# Patient Record
Sex: FEMALE | Race: BLACK OR AFRICAN AMERICAN | NOT HISPANIC OR LATINO | URBAN - METROPOLITAN AREA
[De-identification: names, ages, dates, MRNs, and addresses within clinical notes are randomized per-mention and may not be internally consistent; named-entity substitution may affect disease eponyms.]

---

## 2018-09-25 ENCOUNTER — EMERGENCY (EMERGENCY)
Facility: HOSPITAL | Age: 53
LOS: 1 days | Discharge: HOME | End: 2018-09-25
Attending: EMERGENCY MEDICINE
Payer: MEDICAID

## 2018-09-25 VITALS
TEMPERATURE: 98 F | OXYGEN SATURATION: 99 % | HEART RATE: 71 BPM | DIASTOLIC BLOOD PRESSURE: 94 MMHG | RESPIRATION RATE: 16 BRPM | SYSTOLIC BLOOD PRESSURE: 136 MMHG

## 2018-09-25 VITALS
DIASTOLIC BLOOD PRESSURE: 84 MMHG | SYSTOLIC BLOOD PRESSURE: 147 MMHG | HEART RATE: 79 BPM | OXYGEN SATURATION: 98 % | TEMPERATURE: 98 F | RESPIRATION RATE: 17 BRPM

## 2018-09-25 PROCEDURE — 99284 EMERGENCY DEPT VISIT MOD MDM: CPT

## 2018-09-25 PROCEDURE — 72170 X-RAY EXAM OF PELVIS: CPT | Mod: 26

## 2018-09-25 PROCEDURE — 99284 EMERGENCY DEPT VISIT MOD MDM: CPT | Mod: 25

## 2018-09-25 PROCEDURE — 72170 X-RAY EXAM OF PELVIS: CPT

## 2018-09-25 PROCEDURE — 96372 THER/PROPH/DIAG INJ SC/IM: CPT

## 2018-09-25 RX ORDER — DIAZEPAM 5 MG
1 TABLET ORAL
Qty: 6 | Refills: 0 | OUTPATIENT
Start: 2018-09-25 | End: 2018-09-27

## 2018-09-25 RX ORDER — IBUPROFEN 200 MG
1 TABLET ORAL
Qty: 9 | Refills: 0 | OUTPATIENT
Start: 2018-09-25 | End: 2018-09-27

## 2018-09-25 RX ORDER — DIAZEPAM 5 MG
10 TABLET ORAL ONCE
Qty: 0 | Refills: 0 | Status: DISCONTINUED | OUTPATIENT
Start: 2018-09-25 | End: 2018-09-25

## 2018-09-25 RX ORDER — MORPHINE SULFATE 50 MG/1
4 CAPSULE, EXTENDED RELEASE ORAL ONCE
Qty: 0 | Refills: 0 | Status: DISCONTINUED | OUTPATIENT
Start: 2018-09-25 | End: 2018-09-25

## 2018-09-25 RX ADMIN — MORPHINE SULFATE 4 MILLIGRAM(S): 50 CAPSULE, EXTENDED RELEASE ORAL at 13:13

## 2018-09-25 RX ADMIN — Medication 10 MILLIGRAM(S): at 13:13

## 2018-09-25 RX ADMIN — MORPHINE SULFATE 4 MILLIGRAM(S): 50 CAPSULE, EXTENDED RELEASE ORAL at 13:45

## 2018-09-25 NOTE — ED PROVIDER NOTE - OBJECTIVE STATEMENT
52 y/o F patient with a significant PMHx of DM and no significant PSHx presents to the ED with lower back pain which radiates down to the right leg. Patient states she has been experiencing back pain since Friday 9/21/18. Patient denies any other complaints. Patient denies a history of back pain. Patient notes she normally checks glucose, however, patient did not check today due to presenting symptoms. NKDA.

## 2018-09-25 NOTE — ED ADULT NURSE NOTE - OBJECTIVE STATEMENT
C/O LOWER BACK RADIATING TO RIGHT LEG SINCE FRIDAY EVENING. DENIES LOC. C/O LOWER BACK RADIATING TO RIGHT LEG SINCE FRIDAY EVENING. DENIES LOC.14:30PM STATES SHE  FEELS BETTER , AMBULATING WITH STEADY GAIT.

## 2018-09-25 NOTE — ED ADULT NURSE NOTE - NSIMPLEMENTINTERV_GEN_ALL_ED
Implemented All Universal Safety Interventions:  [place] to call system. Call bell, personal items and telephone within reach. Instruct patient to call for assistance. Room bathroom lighting operational. Non-slip footwear when patient is off stretcher. Physically safe environment: no spills, clutter or unnecessary equipment. Stretcher in lowest position, wheels locked, appropriate side rails in place.

## 2018-10-23 ENCOUNTER — EMERGENCY (EMERGENCY)
Facility: HOSPITAL | Age: 53
LOS: 1 days | Discharge: HOME | End: 2018-10-23
Attending: EMERGENCY MEDICINE
Payer: MEDICAID

## 2018-10-23 ENCOUNTER — TRANSCRIPTION ENCOUNTER (OUTPATIENT)
Age: 53
End: 2018-10-23

## 2018-10-23 VITALS
SYSTOLIC BLOOD PRESSURE: 166 MMHG | OXYGEN SATURATION: 100 % | HEART RATE: 73 BPM | RESPIRATION RATE: 18 BRPM | DIASTOLIC BLOOD PRESSURE: 107 MMHG | TEMPERATURE: 98 F

## 2018-10-23 VITALS — HEIGHT: 71 IN | WEIGHT: 235.01 LBS

## 2018-10-23 PROCEDURE — 73564 X-RAY EXAM KNEE 4 OR MORE: CPT | Mod: 26,RT

## 2018-10-23 PROCEDURE — 73564 X-RAY EXAM KNEE 4 OR MORE: CPT

## 2018-10-23 PROCEDURE — 93971 EXTREMITY STUDY: CPT | Mod: 26,RT

## 2018-10-23 PROCEDURE — 82962 GLUCOSE BLOOD TEST: CPT

## 2018-10-23 PROCEDURE — 93971 EXTREMITY STUDY: CPT

## 2018-10-23 PROCEDURE — 99284 EMERGENCY DEPT VISIT MOD MDM: CPT | Mod: 25

## 2018-10-23 PROCEDURE — 99283 EMERGENCY DEPT VISIT LOW MDM: CPT

## 2018-10-23 RX ORDER — IBUPROFEN 200 MG
600 TABLET ORAL ONCE
Qty: 0 | Refills: 0 | Status: COMPLETED | OUTPATIENT
Start: 2018-10-23 | End: 2018-10-23

## 2018-10-23 RX ADMIN — Medication 600 MILLIGRAM(S): at 19:17

## 2018-10-23 NOTE — ED ADULT NURSE NOTE - OBJECTIVE STATEMENT
pt from home c/o of Rt thigh and Rt knee pain x3 weeks pt is alert awake oriented x3 denies any recent injury ambulatory with steady gait

## 2018-10-23 NOTE — ED PROVIDER NOTE - PHYSICAL EXAMINATION
Right knee full range of motion without swelling or tenderness to palpation, mild tenderness to the right thigh, popiteal dp and patient pulses 2+ bilateral. Right knee full range of motion without swelling or tenderness to palpation, mild tenderness to the right thigh, popiteal dp and PT pulses 2+ bilaterally. No signs of rash or cellulitis.

## 2018-10-23 NOTE — ED PROVIDER NOTE - MEDICAL DECISION MAKING DETAILS
52 y/o F pt presents with ongoing thigh pain, no focal neuro deficit. Hx and findings suggestive of possible radicular pain vs neuropathy pain. Given presentation Will check US r/o DVT, check finger stick, give ibuprofen and reassess.

## 2018-10-23 NOTE — ED PROVIDER NOTE - OBJECTIVE STATEMENT
52 y/o F pt with a PMHx of DM, HTN presents to the ED with complaints of constant right thigh and knee pain for multiple weeks. Patient describes a burning sensation going up her right thigh. Patient reports resolved back pain since September. Patient was given muscle relaxant and ibuprofen for the pain without any relief. Patient notes unwanted weight loss, no hx of blood disorders or blood clots. Patient denies chest pain, shortness of breath, fever or any other complaints. NKDA. 52 y/o F pt with a PMHx of DM, HTN presents to the ED with complaints of constant right thigh and knee pain for multiple weeks. Patient describes a burning sensation going up her right thigh. Patient reports resolved back pain since September. Patient was given muscle relaxant and ibuprofen for the pain without any relief. Patient denies unwanted weight loss, hx of blood disorders or blood clots, fever, chills, saddle anesthesia, focal weakness, pain that gets worse with exercise and relieved with rest, chest pain, shortness of breath, fever or any other complaints. NKDA.

## 2018-10-23 NOTE — ED STATDOCS - OBJECTIVE STATEMENT
Telemedicine assessment was conducted (using real time 2 way audio-video technology) by Dr. Capo Calderon located at [address] [place] [address]  +++++++++++++++++++++++++++++++++++++++++++++++++++  History and Plan: Patient with severe pain in the right leg. Patient states she remembered falling back in July with back pain. Patient states she presented to the ED where she was Dx with sciatica. Patient endorses pain throughout the right leg. Patient describes a burning sensation going up her right leg. Patient notes being prescribed HTN medication for x4-x5 months. Telemedicine assessment was conducted (using real time 2 way audio-video technology) by Dr. Capo Calderon located at [address] [place] [id number]  +++++++++++++++++++++++++++++++++++++++++++++++++++  History and Plan: Patient with severe pain in the right leg. Patient states she remembered falling back in July with back pain. Patient states she presented to the ED where she was Dx with sciatica. Patient endorses pain throughout the right leg, mostly in right knee. patient ambulatory. Patient describes a burning sensation going up her right leg. Patient notes being prescribed HTN medication for x4-x5 months.  no cp or sob.   will xray knee    Patient seen by me in intake for initial assessment and ordering. Physician on site to follow results and further evaluate and treat patient.

## 2018-10-23 NOTE — ED ADULT NURSE NOTE - NSIMPLEMENTINTERV_GEN_ALL_ED
Implemented All Universal Safety Interventions:  [place] to call system. Call bell, personal items and telephone within reach. Instruct patient to call for assistance. Room bathroom lighting operational. Non-slip footwear when patient is off stretcher. Physically safe environment: no spills, clutter or unnecessary equipment. Stretcher in lowest position, wheels locked, appropriate side rails in place.

## 2018-10-23 NOTE — ED PROVIDER NOTE - ATTENDING CONTRIBUTION TO CARE
I was physically present for the E/M service provided. I agree with above history, physical, and plan which I have reviewed and edited where appropriate. I was physically present for the key portions of the service provided.    gay: 54 y/o F pt with a PMHx of DM, HTN presents to the ED with complaints of constant right thigh and knee pain for multiple weeks. Patient describes a burning sensation going up her right thigh. given muscle relaxant and ibuprofen with minimal relieve.  no trauma, no fever, no n/v, no abd pain , no dysuria, no numbness or tingling, no weakness.    *GEN:   comfortable, in no apparent distress, AOx3  *EYES:   PERRL, extra-occular movements intact  *HEENT:   airway patent, moist mucosal membranes, uvula midline  *CV:   regular rate and rhythm, normal S1/S2, no murmur cap refill < 2 sec  *RESP:   clear to auscultation bilaterally, non-labored, speaking in full sentences  *ABD:   soft, non tender, no guarding  *MSK:   no musculoskeletal tenderness with manipulation, 5/5 strength, moving all extremity  *SKIN:   dry, intact, no rash  *NEURO:   AOx3, no focal weakness or loss of sensation, gait normal, GCS 15    a/p: right upper thigh pain r/o dvt with sono vs arthritis vs pad.  xr, sono.  ortho and vascular f/u.

## 2018-10-24 PROBLEM — E11.9 TYPE 2 DIABETES MELLITUS WITHOUT COMPLICATIONS: Chronic | Status: ACTIVE | Noted: 2018-09-25

## 2023-07-07 NOTE — ED ADULT TRIAGE NOTE - ACCOMPANIED BY
Received a fax from [facility] requesting an order for cardiac rehab with a diagnosis and date of event.    LOV with RS 6/29/23.    Placed in RS mailbox.       Self

## 2025-07-01 NOTE — ED PROVIDER NOTE - MUSCULOSKELETAL [+], MLM
Group Topic: BH Recovery Skills    Date: 6/30/2025  Start Time: 1500  End Time: 1545  Facilitators: Fahres, Catherine S, OT    Focus: Distress Tolerance continued  Number in attendance: 9    Method: Group  Attendance: Present  Participation: Active  Patient Response: Attentive, Demonstrated insight, and Interested in topic  Mood: Anxious  Affect: Type: Anxious   Range: Restricted   Congruency: Congruent   Stability: Stable  Behavior/Socialization: Appropriate to group, Cooperative, and Engaged  Thought Process: Demonstrated insight and Tracking  Task Performance: Follows directions  Patient Evaluation: Independent - full participation     BACK PAIN